# Patient Record
Sex: FEMALE | URBAN - METROPOLITAN AREA
[De-identification: names, ages, dates, MRNs, and addresses within clinical notes are randomized per-mention and may not be internally consistent; named-entity substitution may affect disease eponyms.]

---

## 2018-01-02 ENCOUNTER — APPOINTMENT (RX ONLY)
Dept: URBAN - METROPOLITAN AREA CLINIC 126 | Facility: CLINIC | Age: 43
Setting detail: DERMATOLOGY
End: 2018-01-02

## 2018-01-02 DIAGNOSIS — D22 MELANOCYTIC NEVI: ICD-10-CM

## 2018-01-02 DIAGNOSIS — L91.8 OTHER HYPERTROPHIC DISORDERS OF THE SKIN: ICD-10-CM

## 2018-01-02 DIAGNOSIS — D485 NEOPLASM OF UNCERTAIN BEHAVIOR OF SKIN: ICD-10-CM

## 2018-01-02 PROBLEM — D22.4 MELANOCYTIC NEVI OF SCALP AND NECK: Status: ACTIVE | Noted: 2018-01-02

## 2018-01-02 PROBLEM — D48.5 NEOPLASM OF UNCERTAIN BEHAVIOR OF SKIN: Status: ACTIVE | Noted: 2018-01-02

## 2018-01-02 PROCEDURE — ? SKIN TAG REMOVAL

## 2018-01-02 PROCEDURE — 11100: CPT | Mod: 59

## 2018-01-02 PROCEDURE — 11200 RMVL SKIN TAGS UP TO&INC 15: CPT

## 2018-01-02 PROCEDURE — ? COUNSELING

## 2018-01-02 PROCEDURE — 99212 OFFICE O/P EST SF 10 MIN: CPT | Mod: 25

## 2018-01-02 PROCEDURE — ? BIOPSY BY SHAVE METHOD

## 2018-01-02 ASSESSMENT — LOCATION DETAILED DESCRIPTION DERM
LOCATION DETAILED: LEFT CENTRAL POSTERIOR NECK
LOCATION DETAILED: LEFT INFERIOR LATERAL NECK
LOCATION DETAILED: LEFT CENTRAL LATERAL NECK
LOCATION DETAILED: MID POSTERIOR NECK
LOCATION DETAILED: RIGHT CENTRAL LATERAL NECK
LOCATION DETAILED: RIGHT CLAVICULAR NECK

## 2018-01-02 ASSESSMENT — LOCATION SIMPLE DESCRIPTION DERM
LOCATION SIMPLE: RIGHT ANTERIOR NECK
LOCATION SIMPLE: NECK
LOCATION SIMPLE: POSTERIOR NECK

## 2018-01-02 ASSESSMENT — LOCATION ZONE DERM: LOCATION ZONE: NECK

## 2018-01-02 NOTE — PROCEDURE: BIOPSY BY SHAVE METHOD
Lab: [facility]
Electrodesiccation And Curettage Text: The wound bed was treated with electrodesiccation and curettage after the biopsy was performed.
Detail Level: Detailed
Body Location Override (Optional - Billing Will Still Be Based On Selected Body Map Location If Applicable): left neck
Biopsy Method: Double edge Personna blades
X Size Of Lesion In Cm: 0
Cryotherapy Text: The wound bed was treated with cryotherapy after the biopsy was performed.
Notification Instructions: Patient will be notified of biopsy results. However, patient instructed to call the office if not contacted within 2 weeks.
Wound Care: Polysporin ointment
Render Post-Care Instructions In Note?: no
Dressing: pressure dressing with telfa
Anesthesia Volume In Cc (Will Not Render If 0): 0.5
Hemostasis: Aluminum Chloride and Electrocautery
Post-Care Instructions: I reviewed with the patient in detail post-care instructions. Patient is to keep the biopsy site dry overnight, and then apply polysporin or vasoline twice daily until healed. Patient is to keep the lesion covered with a bandaid.
Electrodesiccation Text: The wound bed was treated with electrodesiccation after the biopsy was performed.
Type Of Destruction Used: Curettage
Curettage Text: The wound bed was treated with curettage after the biopsy was performed.
Billing Type: [employer]
Biopsy Type: H and E
Lab Facility: [address]
Path Notes (To The Dermatopathologist): 0.5 cm
Silver Nitrate Text: The wound bed was treated with silver nitrate after the biopsy was performed.
Anesthesia Type: 1% lidocaine without epinephrine and a 1:10 solution of 8.4% sodium bicarbonate
Consent: Written consent was obtained and risks were reviewed including but not limited to scarring, infection, bleeding, scabbing, incomplete removal, nerve damage and allergy to anesthesia.

## 2018-01-02 NOTE — PROCEDURE: SKIN TAG REMOVAL
Medical Necessity Clause: This procedure was medically necessary because the lesions that were treated were:
Add Associated Diagnoses If Applicable When Selecting Medical Necessity: Yes
Include Z78.9 (Other Specified Conditions Influencing Health Status) As An Associated Diagnosis?: No
Consent: Written consent obtained and the risks of skin tag removal was reviewed with the patient including but not limited to bleeding, pigmentary change, infection, pain, and remote possibility of scarring.
Anesthesia Volume In Cc: 0
Medical Necessity Information: It is in your best interest to select a reason for this procedure from the list below. All of these items fulfill various CMS LCD requirements except the new and changing color options.
Detail Level: Detailed

## 2018-01-12 ENCOUNTER — APPOINTMENT (RX ONLY)
Dept: URBAN - METROPOLITAN AREA CLINIC 126 | Facility: CLINIC | Age: 43
Setting detail: DERMATOLOGY
End: 2018-01-12

## 2018-01-12 DIAGNOSIS — L65.9 NONSCARRING HAIR LOSS, UNSPECIFIED: ICD-10-CM

## 2018-01-12 DIAGNOSIS — L21.8 OTHER SEBORRHEIC DERMATITIS: ICD-10-CM

## 2018-01-12 PROCEDURE — ? PRESCRIPTION

## 2018-01-12 PROCEDURE — ? TREATMENT REGIMEN

## 2018-01-12 PROCEDURE — ? COUNSELING

## 2018-01-12 PROCEDURE — 99213 OFFICE O/P EST LOW 20 MIN: CPT | Mod: 24

## 2018-01-12 RX ORDER — CLOBETASOL PROPIONATE 0.5 MG/G
AEROSOL, FOAM TOPICAL
Qty: 1 | Refills: 2 | Status: ERX | COMMUNITY
Start: 2018-01-12

## 2018-01-12 RX ADMIN — CLOBETASOL PROPIONATE: 0.5 AEROSOL, FOAM TOPICAL at 17:28

## 2018-01-12 ASSESSMENT — LOCATION ZONE DERM: LOCATION ZONE: SCALP

## 2018-01-12 ASSESSMENT — LOCATION SIMPLE DESCRIPTION DERM: LOCATION SIMPLE: ANTERIOR SCALP

## 2018-01-12 ASSESSMENT — LOCATION DETAILED DESCRIPTION DERM: LOCATION DETAILED: MID-FRONTAL SCALP

## 2018-04-22 ENCOUNTER — NURSE TRIAGE (OUTPATIENT)
Dept: NURSING | Facility: CLINIC | Age: 43
End: 2018-04-22

## 2018-04-22 NOTE — TELEPHONE ENCOUNTER
She found our number on line and is calling because the lab and clinics in her area are closed. She wanted to know what to do with the urine she has to collect in a bottle. She needed to know if she should use all of the borax tablets she was given to put into the jug and should they be put in first? I advised the tablets act as a preservative/stabilizer and should go into the jug from the beginning of the collection. She was unsure if she should use them all, five tablets. I advised, since they didn't instruct her otherwise, she should use them all and it'll become more dilute as more urine is added. She then asked if the specimin has to remain cold when transporting to the lab. I advised, if she's not making other stops along the way to the lab, removing it from her fridge then transporting it to the lab, it should stay cool enough for the testing. If she plans on stopping anywhere she should try and keep it cold.  Berkley Larios RN-[facility]

## 2018-07-10 ENCOUNTER — APPOINTMENT (RX ONLY)
Dept: URBAN - METROPOLITAN AREA CLINIC 126 | Facility: CLINIC | Age: 43
Setting detail: DERMATOLOGY
End: 2018-07-10

## 2018-07-10 DIAGNOSIS — Z02.9 ENCOUNTER FOR ADMINISTRATIVE EXAMINATIONS, UNSPECIFIED: ICD-10-CM

## 2018-07-10 PROCEDURE — 99025: CPT
